# Patient Record
Sex: FEMALE | Race: BLACK OR AFRICAN AMERICAN | Employment: FULL TIME | URBAN - METROPOLITAN AREA
[De-identification: names, ages, dates, MRNs, and addresses within clinical notes are randomized per-mention and may not be internally consistent; named-entity substitution may affect disease eponyms.]

---

## 2017-05-15 ENCOUNTER — HOSPITAL ENCOUNTER (OUTPATIENT)
Dept: OTHER | Age: 52
Discharge: HOME | End: 2017-05-15
Attending: SPECIALIST | Admitting: SPECIALIST

## 2017-05-15 LAB
A/G RATIO: 1.2 (ref 1.1–2.2)
ALBUMIN SERPL-MCNC: 3.8 G/DL (ref 3.4–5)
ALP BLD-CCNC: 111 U/L (ref 40–129)
ALT SERPL-CCNC: 21 U/L (ref 10–40)
ANION GAP SERPL CALCULATED.3IONS-SCNC: 13 MMOL/L (ref 3–16)
AST SERPL-CCNC: 16 U/L (ref 15–37)
BILIRUB SERPL-MCNC: <0.2 MG/DL (ref 0–1)
BUN BLDV-MCNC: 16 MG/DL (ref 7–20)
CALCIUM SERPL-MCNC: 9.4 MG/DL (ref 8.3–10.6)
CHLORIDE BLD-SCNC: 101 MMOL/L (ref 99–110)
CO2: 27 MMOL/L (ref 21–32)
CREAT SERPL-MCNC: 1.1 MG/DL (ref 0.6–1.1)
GFR AFRICAN AMERICAN: >60
GFR NON-AFRICAN AMERICAN: 52
GLOBULIN: 3.1 G/DL
GLUCOSE BLD-MCNC: 87 MG/DL (ref 70–99)
HCT VFR BLD CALC: 37.9 % (ref 36–48)
HEMOGLOBIN: 11.7 G/DL (ref 12–16)
MCH RBC QN AUTO: 25.8 PG (ref 26–34)
MCHC RBC AUTO-ENTMCNC: 31 G/DL (ref 31–36)
MCV RBC AUTO: 83.3 FL (ref 80–100)
PDW BLD-RTO: 16.7 % (ref 12.4–15.4)
PLATELET # BLD: 308 K/UL (ref 135–450)
PMV BLD AUTO: 8.5 FL (ref 5–10.5)
POTASSIUM SERPL-SCNC: 4.4 MMOL/L (ref 3.5–5.1)
RBC # BLD: 4.54 M/UL (ref 4–5.2)
SODIUM BLD-SCNC: 141 MMOL/L (ref 136–145)
T4 FREE: 0.9 NG/DL (ref 0.9–1.8)
T4 TOTAL: 4.7 UG/DL (ref 4.5–10.9)
TOTAL PROTEIN: 6.9 G/DL (ref 6.4–8.2)
TSH SERPL DL<=0.05 MIU/L-ACNC: 3.1 UIU/ML (ref 0.27–4.2)
WBC # BLD: 11.1 K/UL (ref 4–11)

## 2017-10-03 ENCOUNTER — HOSPITAL ENCOUNTER (OUTPATIENT)
Dept: NON INVASIVE DIAGNOSTICS | Age: 52
Discharge: HOME | End: 2017-10-03
Attending: SPECIALIST | Admitting: SPECIALIST

## 2017-10-03 LAB
LEFT VENTRICULAR EJECTION FRACTION HIGH VALUE: 55 %
LEFT VENTRICULAR EJECTION FRACTION MODE: NORMAL
LV EF: 55 %
LVEF MODALITY: NORMAL

## 2023-04-26 ENCOUNTER — HOSPITAL ENCOUNTER (EMERGENCY)
Age: 58
Discharge: HOME | End: 2023-04-26
Attending: EMERGENCY MEDICINE
Payer: COMMERCIAL

## 2023-04-26 VITALS
BODY MASS INDEX: 40.24 KG/M2 | RESPIRATION RATE: 16 BRPM | OXYGEN SATURATION: 100 % | HEIGHT: 63 IN | SYSTOLIC BLOOD PRESSURE: 155 MMHG | HEART RATE: 94 BPM | TEMPERATURE: 97.1 F | DIASTOLIC BLOOD PRESSURE: 87 MMHG | WEIGHT: 227.1 LBS

## 2023-04-26 DIAGNOSIS — R11.2 NAUSEA AND VOMITING, UNSPECIFIED VOMITING TYPE: Primary | ICD-10-CM

## 2023-04-26 LAB
ALBUMIN SERPL-MCNC: 4 G/DL (ref 3.4–5)
ALBUMIN/GLOB SERPL: 1.1 {RATIO} (ref 1.1–2.2)
ALP SERPL-CCNC: 120 U/L (ref 40–129)
ALT SERPL-CCNC: 21 U/L (ref 10–40)
ANION GAP SERPL CALCULATED.3IONS-SCNC: 11 MMOL/L (ref 3–16)
AST SERPL-CCNC: 21 U/L (ref 15–37)
BACTERIA URNS QL MICRO: ABNORMAL /HPF
BASOPHILS # BLD: 0.1 K/UL (ref 0–0.2)
BASOPHILS NFR BLD: 0.7 %
BILIRUB SERPL-MCNC: <0.2 MG/DL (ref 0–1)
BILIRUB UR QL STRIP.AUTO: NEGATIVE
BUN SERPL-MCNC: 10 MG/DL (ref 7–20)
CALCIUM SERPL-MCNC: 9.8 MG/DL (ref 8.3–10.6)
CHLORIDE SERPL-SCNC: 103 MMOL/L (ref 99–110)
CLARITY UR: ABNORMAL
CO2 SERPL-SCNC: 27 MMOL/L (ref 21–32)
COLOR UR: YELLOW
CREAT SERPL-MCNC: 0.7 MG/DL (ref 0.6–1.1)
DEPRECATED RDW RBC AUTO: 15.9 % (ref 12.4–15.4)
EOSINOPHIL # BLD: 0.1 K/UL (ref 0–0.6)
EOSINOPHIL NFR BLD: 1.1 %
EPI CELLS #/AREA URNS HPF: ABNORMAL /HPF (ref 0–5)
GFR SERPLBLD CREATININE-BSD FMLA CKD-EPI: >60 ML/MIN/{1.73_M2}
GLUCOSE SERPL-MCNC: 113 MG/DL (ref 70–99)
GLUCOSE UR STRIP.AUTO-MCNC: NEGATIVE MG/DL
HCT VFR BLD AUTO: 38.6 % (ref 36–48)
HGB BLD-MCNC: 12.3 G/DL (ref 12–16)
HGB UR QL STRIP.AUTO: ABNORMAL
KETONES UR STRIP.AUTO-MCNC: NEGATIVE MG/DL
LEUKOCYTE ESTERASE UR QL STRIP.AUTO: NEGATIVE
LIPASE SERPL-CCNC: 29 U/L (ref 13–60)
LYMPHOCYTES # BLD: 3.5 K/UL (ref 1–5.1)
LYMPHOCYTES NFR BLD: 26 %
MCH RBC QN AUTO: 24.9 PG (ref 26–34)
MCHC RBC AUTO-ENTMCNC: 31.8 G/DL (ref 31–36)
MCV RBC AUTO: 78.4 FL (ref 80–100)
MONOCYTES # BLD: 1 K/UL (ref 0–1.3)
MONOCYTES NFR BLD: 7.5 %
MUCOUS THREADS #/AREA URNS LPF: ABNORMAL /LPF
NEUTROPHILS # BLD: 8.7 K/UL (ref 1.7–7.7)
NEUTROPHILS NFR BLD: 64.7 %
NITRITE UR QL STRIP.AUTO: NEGATIVE
PH UR STRIP.AUTO: 5.5 [PH] (ref 5–8)
PLATELET # BLD AUTO: 336 K/UL (ref 135–450)
PMV BLD AUTO: 8 FL (ref 5–10.5)
POTASSIUM SERPL-SCNC: 4 MMOL/L (ref 3.5–5.1)
PROT SERPL-MCNC: 7.6 G/DL (ref 6.4–8.2)
PROT UR STRIP.AUTO-MCNC: NEGATIVE MG/DL
RBC # BLD AUTO: 4.92 M/UL (ref 4–5.2)
RBC #/AREA URNS HPF: ABNORMAL /HPF (ref 0–4)
SODIUM SERPL-SCNC: 141 MMOL/L (ref 136–145)
SP GR UR STRIP.AUTO: 1.02 (ref 1–1.03)
UA COMPLETE W REFLEX CULTURE PNL UR: ABNORMAL
UA DIPSTICK W REFLEX MICRO PNL UR: YES
URN SPEC COLLECT METH UR: ABNORMAL
UROBILINOGEN UR STRIP-ACNC: 0.2 E.U./DL
WBC # BLD AUTO: 13.5 K/UL (ref 4–11)
WBC #/AREA URNS HPF: ABNORMAL /HPF (ref 0–5)

## 2023-04-26 PROCEDURE — 85025 COMPLETE CBC W/AUTO DIFF WBC: CPT

## 2023-04-26 PROCEDURE — 81001 URINALYSIS AUTO W/SCOPE: CPT

## 2023-04-26 PROCEDURE — 99284 EMERGENCY DEPT VISIT MOD MDM: CPT

## 2023-04-26 PROCEDURE — 2580000003 HC RX 258: Performed by: EMERGENCY MEDICINE

## 2023-04-26 PROCEDURE — 6360000002 HC RX W HCPCS: Performed by: EMERGENCY MEDICINE

## 2023-04-26 PROCEDURE — 80053 COMPREHEN METABOLIC PANEL: CPT

## 2023-04-26 PROCEDURE — 83690 ASSAY OF LIPASE: CPT

## 2023-04-26 PROCEDURE — 93005 ELECTROCARDIOGRAM TRACING: CPT | Performed by: EMERGENCY MEDICINE

## 2023-04-26 RX ORDER — ONDANSETRON 4 MG/1
4 TABLET, FILM COATED ORAL 3 TIMES DAILY PRN
Qty: 15 TABLET | Refills: 0 | Status: SHIPPED | OUTPATIENT
Start: 2023-04-26

## 2023-04-26 RX ORDER — ONDANSETRON 2 MG/ML
4 INJECTION INTRAMUSCULAR; INTRAVENOUS ONCE
Status: COMPLETED | OUTPATIENT
Start: 2023-04-26 | End: 2023-04-26

## 2023-04-26 RX ORDER — 0.9 % SODIUM CHLORIDE 0.9 %
1000 INTRAVENOUS SOLUTION INTRAVENOUS ONCE
Status: COMPLETED | OUTPATIENT
Start: 2023-04-26 | End: 2023-04-26

## 2023-04-26 RX ADMIN — ONDANSETRON 4 MG: 2 INJECTION INTRAMUSCULAR; INTRAVENOUS at 14:40

## 2023-04-26 RX ADMIN — SODIUM CHLORIDE 1000 ML: 9 INJECTION, SOLUTION INTRAVENOUS at 14:40

## 2023-04-26 ASSESSMENT — PAIN - FUNCTIONAL ASSESSMENT: PAIN_FUNCTIONAL_ASSESSMENT: 0-10

## 2023-04-26 ASSESSMENT — PAIN DESCRIPTION - LOCATION: LOCATION: ABDOMEN

## 2023-04-26 ASSESSMENT — PAIN SCALES - GENERAL: PAINLEVEL_OUTOF10: 6

## 2023-04-26 NOTE — ED PROVIDER NOTES
Emergency Department Provider Note  Location: [address] [place] [address]  4/26/2023     Patient Identification  Stefania Joseph is a 59 y.o. female    Chief Complaint  Emesis (Pt took a dose of Trulicity (says dose had a \"4\" in it, but unsure what actual dose was) last Wednesday. Began having vomiting and bloating Wednesday night that has persisted until today. Pt is not prescribed Trulicity, but used her friends in order to see if it would help her lose weight. )          HPI  (History provided by patient)  Patient is a 47-year-old female with history of thyroid disease morbid obesity, who presents for nausea vomiting diarrhea since last Wednesday. Patient reports that she purposely injected a friend's Trulicity, unknown dose, last Wednesday. She did this intentionally for weight loss purposes. Few hours after injecting and she developed nausea followed by nonbloody nonbilious emesis and loose stools. She denies any fevers or abdominal pains chest pain exertional symptoms. States she has decreased appetite. She is here because she is still nauseous and starts mainly after she starts eating food. I have reviewed the following nursing documentation:  Allergies: No Known Allergies    Past medical history:  has a past medical history of Headache, Headache(784.0), History of broken nose, Hyperthyroidism, Thyroid activity decreased, and Trigeminal neuralgia. Past surgical history:  has a past surgical history that includes knee surgery (Left). Home medications:   Prior to Admission medications    Medication Sig Start Date End Date Taking?  Authorizing Provider   ondansetron (ZOFRAN) 4 MG tablet Take 1 tablet by mouth 3 times daily as needed for Nausea or Vomiting 4/26/23  Yes Blake Duval MD   naproxen (NAPROSYN) 500 MG tablet Take 1 tablet by mouth 2 times daily (with meals)  Patient not taking: Reported on 4/26/2023 4/18/16   INA Bertrand   losartan (COZAAR) 100 MG tablet Take 100

## 2023-04-26 NOTE — DISCHARGE INSTRUCTIONS
Do not use other peoples medications. Stay hydrated. Drink plenty of fluid. We will prescribing you nausea medication so you can stay hydrated. Follow-up with primary doctor. Return to emergency department any new emergent concerns.

## 2023-04-27 LAB
EKG ATRIAL RATE: 86 BPM
EKG DIAGNOSIS: NORMAL
EKG P AXIS: 47 DEGREES
EKG P-R INTERVAL: 174 MS
EKG Q-T INTERVAL: 350 MS
EKG QRS DURATION: 88 MS
EKG QTC CALCULATION (BAZETT): 418 MS
EKG R AXIS: 19 DEGREES
EKG T AXIS: 23 DEGREES
EKG VENTRICULAR RATE: 86 BPM

## 2023-04-27 PROCEDURE — 93010 ELECTROCARDIOGRAM REPORT: CPT | Performed by: INTERNAL MEDICINE

## 2024-07-17 ENCOUNTER — APPOINTMENT (OUTPATIENT)
Dept: GENERAL RADIOLOGY | Age: 59
End: 2024-07-17
Payer: COMMERCIAL

## 2024-07-17 ENCOUNTER — APPOINTMENT (OUTPATIENT)
Dept: CT IMAGING | Age: 59
End: 2024-07-17
Payer: COMMERCIAL

## 2024-07-17 ENCOUNTER — HOSPITAL ENCOUNTER (EMERGENCY)
Age: 59
Discharge: HOME | End: 2024-07-17
Attending: EMERGENCY MEDICINE
Payer: COMMERCIAL

## 2024-07-17 VITALS
WEIGHT: 230.6 LBS | DIASTOLIC BLOOD PRESSURE: 85 MMHG | SYSTOLIC BLOOD PRESSURE: 187 MMHG | HEART RATE: 88 BPM | TEMPERATURE: 98.4 F | BODY MASS INDEX: 40.85 KG/M2 | RESPIRATION RATE: 20 BRPM | OXYGEN SATURATION: 99 %

## 2024-07-17 DIAGNOSIS — M54.12 CERVICAL RADICULOPATHY: Primary | ICD-10-CM

## 2024-07-17 PROCEDURE — 70450 CT HEAD/BRAIN W/O DYE: CPT

## 2024-07-17 PROCEDURE — 6360000002 HC RX W HCPCS: Performed by: EMERGENCY MEDICINE

## 2024-07-17 PROCEDURE — 6370000000 HC RX 637 (ALT 250 FOR IP): Performed by: EMERGENCY MEDICINE

## 2024-07-17 PROCEDURE — 99284 EMERGENCY DEPT VISIT MOD MDM: CPT

## 2024-07-17 PROCEDURE — 96372 THER/PROPH/DIAG INJ SC/IM: CPT

## 2024-07-17 PROCEDURE — 73030 X-RAY EXAM OF SHOULDER: CPT

## 2024-07-17 PROCEDURE — 72125 CT NECK SPINE W/O DYE: CPT

## 2024-07-17 RX ORDER — LORAZEPAM 0.5 MG/1
0.5 TABLET ORAL ONCE
Status: COMPLETED | OUTPATIENT
Start: 2024-07-17 | End: 2024-07-17

## 2024-07-17 RX ORDER — LIDOCAINE 50 MG/G
1 PATCH TOPICAL DAILY
Qty: 30 PATCH | Refills: 0 | Status: SHIPPED | OUTPATIENT
Start: 2024-07-17

## 2024-07-17 RX ORDER — LORAZEPAM 1 MG/1
TABLET ORAL
Qty: 6 TABLET | Refills: 0 | Status: SHIPPED | OUTPATIENT
Start: 2024-07-17 | End: 2024-07-20

## 2024-07-17 RX ORDER — PREDNISONE 10 MG/1
60 TABLET ORAL DAILY
Qty: 30 TABLET | Refills: 0 | Status: SHIPPED | OUTPATIENT
Start: 2024-07-17 | End: 2024-07-22

## 2024-07-17 RX ORDER — OXYCODONE HYDROCHLORIDE AND ACETAMINOPHEN 5; 325 MG/1; MG/1
1-2 TABLET ORAL EVERY 6 HOURS PRN
Qty: 6 TABLET | Refills: 0 | Status: SHIPPED | OUTPATIENT
Start: 2024-07-17 | End: 2024-07-24

## 2024-07-17 RX ORDER — KETOROLAC TROMETHAMINE 30 MG/ML
30 INJECTION, SOLUTION INTRAMUSCULAR; INTRAVENOUS ONCE
Status: COMPLETED | OUTPATIENT
Start: 2024-07-17 | End: 2024-07-17

## 2024-07-17 RX ADMIN — LORAZEPAM 0.5 MG: 0.5 TABLET ORAL at 01:29

## 2024-07-17 RX ADMIN — KETOROLAC TROMETHAMINE 30 MG: 30 INJECTION INTRAMUSCULAR; INTRAVENOUS at 01:29

## 2024-07-17 ASSESSMENT — PAIN DESCRIPTION - LOCATION: LOCATION: SHOULDER;NECK;ARM

## 2024-07-17 ASSESSMENT — PAIN - FUNCTIONAL ASSESSMENT: PAIN_FUNCTIONAL_ASSESSMENT: 0-10

## 2024-07-17 ASSESSMENT — PAIN DESCRIPTION - ORIENTATION: ORIENTATION: RIGHT

## 2024-07-17 ASSESSMENT — PAIN SCALES - GENERAL: PAINLEVEL_OUTOF10: 9

## 2024-07-17 NOTE — ED PROVIDER NOTES
TABLET    Take 5 mg by mouth 3 times daily    NAPROXEN (NAPROSYN) 500 MG TABLET    Take 1 tablet by mouth 2 times daily (with meals)    OMEPRAZOLE (PRILOSEC) 20 MG CAPSULE    Take 20 mg by mouth daily    ONDANSETRON (ZOFRAN) 4 MG TABLET    Take 1 tablet by mouth 3 times daily as needed for Nausea or Vomiting    OXCARBAZEPINE (TRILEPTAL) 150 MG TABLET    Take 150 mg by mouth 2 times daily       ALLERGIES     Patient has no known allergies.    FAMILY HISTORY       Family History   Problem Relation Age of Onset    Arthritis Mother     High Blood Pressure Father     Asthma Brother     Arthritis Maternal Aunt     Cancer Maternal Aunt     High Blood Pressure Maternal Aunt     Stroke Maternal Aunt     Arthritis Maternal Uncle     Stroke Paternal Aunt     Arthritis Maternal Grandmother     Diabetes Maternal Grandmother     High Blood Pressure Maternal Grandmother     Cancer Maternal Grandfather     Diabetes Maternal Grandfather     Mental Illness Maternal Cousin           SOCIAL HISTORY       Social History     Socioeconomic History    Marital status:      Spouse name: None    Number of children: None    Years of education: None    Highest education level: None   Tobacco Use    Smoking status: Former     Current packs/day: 0.50     Average packs/day: 0.5 packs/day for 35.2 years (17.6 ttl pk-yrs)     Types: Cigarettes     Start date: 4/24/1989    Tobacco comments:     the patient smokes a half a pack a day, pt is ready to quit   Substance and Sexual Activity    Alcohol use: Not Currently    Drug use: Yes     Types: Marijuana (Weed)    Sexual activity: Defer       SCREENINGS    Kristin Coma Scale  Eye Opening: Spontaneous  Best Verbal Response: Oriented  Best Motor Response: Obeys commands  Kristin Coma Scale Score: 15 @FLOW(27621327)@      PHYSICAL EXAM    (up to 7 for level 4, 8 or more for level 5)     ED Triage Vitals [07/17/24 0119]   BP Temp Temp Source Pulse Respirations SpO2 Height Weight - Scale   (!) 187/85

## 2024-07-17 NOTE — DISCHARGE INSTRUCTIONS
Discharge home  Sling for comfort  Lidoderm patch  Ativan taper  Prednisone  Follow-up with [facility]  Alternate ice and heat

## 2024-10-21 ENCOUNTER — HOSPITAL ENCOUNTER (EMERGENCY)
Age: 59
Discharge: HOME | End: 2024-10-21
Attending: EMERGENCY MEDICINE
Payer: COMMERCIAL

## 2024-10-21 VITALS
SYSTOLIC BLOOD PRESSURE: 157 MMHG | RESPIRATION RATE: 14 BRPM | WEIGHT: 240 LBS | TEMPERATURE: 98.3 F | OXYGEN SATURATION: 99 % | HEART RATE: 102 BPM | BODY MASS INDEX: 42.52 KG/M2 | DIASTOLIC BLOOD PRESSURE: 84 MMHG | HEIGHT: 63 IN

## 2024-10-21 DIAGNOSIS — M54.31 SCIATICA OF RIGHT SIDE: Primary | ICD-10-CM

## 2024-10-21 PROCEDURE — 6360000002 HC RX W HCPCS: Performed by: EMERGENCY MEDICINE

## 2024-10-21 PROCEDURE — 96372 THER/PROPH/DIAG INJ SC/IM: CPT

## 2024-10-21 PROCEDURE — 99284 EMERGENCY DEPT VISIT MOD MDM: CPT

## 2024-10-21 RX ORDER — HYDROCODONE BITARTRATE AND ACETAMINOPHEN 5; 325 MG/1; MG/1
1 TABLET ORAL EVERY 6 HOURS PRN
Qty: 12 TABLET | Refills: 0 | Status: SHIPPED | OUTPATIENT
Start: 2024-10-21 | End: 2024-10-24

## 2024-10-21 RX ORDER — KETOROLAC TROMETHAMINE 30 MG/ML
30 INJECTION, SOLUTION INTRAMUSCULAR; INTRAVENOUS ONCE
Status: COMPLETED | OUTPATIENT
Start: 2024-10-21 | End: 2024-10-21

## 2024-10-21 RX ORDER — LIDOCAINE 4 G/G
1 PATCH TOPICAL EVERY 24 HOURS
Qty: 30 PATCH | Refills: 0 | Status: SHIPPED | OUTPATIENT
Start: 2024-10-21 | End: 2024-11-20

## 2024-10-21 RX ORDER — NAPROXEN 500 MG/1
500 TABLET ORAL 2 TIMES DAILY PRN
Qty: 20 TABLET | Refills: 0 | Status: SHIPPED | OUTPATIENT
Start: 2024-10-21 | End: 2024-10-31

## 2024-10-21 RX ORDER — METHOCARBAMOL 500 MG/1
500 TABLET, FILM COATED ORAL 4 TIMES DAILY PRN
Qty: 20 TABLET | Refills: 0 | Status: SHIPPED | OUTPATIENT
Start: 2024-10-21 | End: 2024-10-26

## 2024-10-21 RX ORDER — METHYLPREDNISOLONE 4 MG/1
TABLET ORAL
Qty: 1 KIT | Refills: 0 | Status: SHIPPED | OUTPATIENT
Start: 2024-10-21 | End: 2024-10-27

## 2024-10-21 RX ADMIN — KETOROLAC TROMETHAMINE 30 MG: 30 INJECTION INTRAMUSCULAR; INTRAVENOUS at 11:28

## 2024-10-21 ASSESSMENT — PAIN - FUNCTIONAL ASSESSMENT
PAIN_FUNCTIONAL_ASSESSMENT: 0-10
PAIN_FUNCTIONAL_ASSESSMENT: 0-10

## 2024-10-21 ASSESSMENT — PAIN DESCRIPTION - DESCRIPTORS
DESCRIPTORS: ACHING
DESCRIPTORS: ACHING

## 2024-10-21 ASSESSMENT — PAIN DESCRIPTION - FREQUENCY
FREQUENCY: CONTINUOUS
FREQUENCY: CONTINUOUS

## 2024-10-21 ASSESSMENT — PAIN DESCRIPTION - PAIN TYPE
TYPE: ACUTE PAIN
TYPE: ACUTE PAIN

## 2024-10-21 ASSESSMENT — PAIN DESCRIPTION - LOCATION
LOCATION: BACK
LOCATION: BACK

## 2024-10-21 ASSESSMENT — PAIN SCALES - GENERAL
PAINLEVEL_OUTOF10: 9
PAINLEVEL_OUTOF10: 9

## 2024-10-21 NOTE — ED NOTES
Patient to ed with complaints of back pain which as been intermittent in the past, pain started the first of this month and worsened patient denies injury.

## 2024-10-21 NOTE — ED PROVIDER NOTES
New Prescriptions    HYDROCODONE-ACETAMINOPHEN (NORCO) 5-325 MG PER TABLET    Take 1 tablet by mouth every 6 hours as needed for Pain for up to 3 days. Intended supply: 3 days. Take lowest dose possible to manage pain Max Daily Amount: 4 tablets    LIDOCAINE 4 % EXTERNAL PATCH    Place 1 patch onto the skin every 24 hours Place 1 patch onto the skin daily 12 hours on, 12 hours off.    METHOCARBAMOL (ROBAXIN) 500 MG TABLET    Take 1 tablet by mouth 4 times daily as needed (Muscle spasms)    METHYLPREDNISOLONE (MEDROL DOSEPACK) 4 MG TABLET    Take by mouth.    NAPROXEN (NAPROSYN) 500 MG TABLET    Take 1 tablet by mouth 2 times daily as needed for Pain (with meals)         Problems Addressed This Visit:      1.  Sciatica: Right-sided.  Neurologically intact.  No weakness no numbness.  Reproducible tenderness.  -Discharged with Naprosyn, Robaxin, Medrol Dosepak and a small number of Vicodin for severe pain  -No lifting greater than 5 pounds for 1 week  -Work note given for today and tomorrow  -Referral to [facility]  -Back pain instructions reviewed          Discussed Patient with another provider(s) and Healthcare Team:     Social Determinants of Health:   Care of patient discussed with nursing team and nursing documentation reviewed.        IMPRESSION:  1. Sciatica of right side           Critical Care Time: none     DISPOSITION: home    Condition at Discharge/Transfer from Department:  Stable        In cases where narcotics are prescribed, MASSIMO report was obtained, reviewed, and made part of record. After examining available information, and risks of prescribing or dispensing controlled substances was explained to the patient (including non-treatment or other treatment), it is considered medically appropriate to administer narcotics as prescribed.      Radha Olson MD  Emergency Medicine      10/21/24     Radha Olson MD  10/21/24 0671

## 2024-10-21 NOTE — DISCHARGE INSTR - COC
Facility/ Agency   Name:   Address:  Phone:  Fax:    Dialysis Facility (if applicable)   Name:  Address:  Dialysis Schedule:  Phone:  Fax:    / signature: {Esignature:[id number]}    PHYSICIAN SECTION    Prognosis: {Prognosis:3710885169}    Condition at Discharge: {MH Patient Condition:697623988}    Rehab Potential (if transferring to Rehab): {Prognosis:8581067622}    Recommended Labs or Other Treatments After Discharge: ***    Physician Certification: I certify the above information and transfer of Susan Lofton  is necessary for the continuing treatment of the diagnosis listed and that she requires {Admit to Appropriate Level of Care:51777} for {GREATER/LESS:912873732} 30 days.     Update Admission H&P: {CHP DME Changes in HandP:077109484}    PHYSICIAN SIGNATURE:  {Esignature:[id number]}

## 2024-12-23 ENCOUNTER — HOSPITAL ENCOUNTER (EMERGENCY)
Age: 59
Discharge: HOME | End: 2024-12-23
Payer: COMMERCIAL

## 2024-12-23 ENCOUNTER — APPOINTMENT (OUTPATIENT)
Dept: GENERAL RADIOLOGY | Age: 59
End: 2024-12-23
Payer: COMMERCIAL

## 2024-12-23 VITALS
BODY MASS INDEX: 40.75 KG/M2 | RESPIRATION RATE: 16 BRPM | DIASTOLIC BLOOD PRESSURE: 79 MMHG | OXYGEN SATURATION: 99 % | HEART RATE: 98 BPM | HEIGHT: 63 IN | TEMPERATURE: 98.7 F | SYSTOLIC BLOOD PRESSURE: 166 MMHG | WEIGHT: 230 LBS

## 2024-12-23 DIAGNOSIS — U07.1 COVID-19: Primary | ICD-10-CM

## 2024-12-23 LAB
ALBUMIN SERPL-MCNC: 3.5 G/DL (ref 3.4–5)
ALBUMIN/GLOB SERPL: 0.9 {RATIO} (ref 1.1–2.2)
ALP SERPL-CCNC: 140 U/L (ref 40–129)
ALT SERPL-CCNC: 19 U/L (ref 10–40)
ANION GAP SERPL CALCULATED.3IONS-SCNC: 9 MMOL/L (ref 3–16)
AST SERPL-CCNC: 24 U/L (ref 15–37)
BACTERIA URNS QL MICRO: ABNORMAL /HPF
BASOPHILS # BLD: 0 K/UL (ref 0–0.2)
BASOPHILS NFR BLD: 0.5 %
BILIRUB SERPL-MCNC: 0.3 MG/DL (ref 0–1)
BILIRUB UR QL STRIP.AUTO: NEGATIVE
BUN SERPL-MCNC: 9 MG/DL (ref 7–20)
CALCIUM SERPL-MCNC: 9.7 MG/DL (ref 8.3–10.6)
CHLORIDE SERPL-SCNC: 100 MMOL/L (ref 99–110)
CLARITY UR: CLEAR
CO2 SERPL-SCNC: 26 MMOL/L (ref 21–32)
COLOR UR: YELLOW
CREAT SERPL-MCNC: 0.6 MG/DL (ref 0.6–1.2)
DEPRECATED RDW RBC AUTO: 15.2 % (ref 12.4–15.4)
EOSINOPHIL # BLD: 0 K/UL (ref 0–0.6)
EOSINOPHIL NFR BLD: 0.4 %
EPI CELLS #/AREA URNS AUTO: 8 /HPF (ref 0–5)
FLUAV RNA RESP QL NAA+PROBE: NOT DETECTED
FLUBV RNA RESP QL NAA+PROBE: NOT DETECTED
GFR SERPLBLD CREATININE-BSD FMLA CKD-EPI: >90 ML/MIN/{1.73_M2}
GLUCOSE SERPL-MCNC: 224 MG/DL (ref 70–99)
GLUCOSE UR STRIP.AUTO-MCNC: 250 MG/DL
HCT VFR BLD AUTO: 38.7 % (ref 36–48)
HGB BLD-MCNC: 12.3 G/DL (ref 12–16)
HGB UR QL STRIP.AUTO: NEGATIVE
HYALINE CASTS #/AREA URNS AUTO: 0 /LPF (ref 0–8)
KETONES UR STRIP.AUTO-MCNC: NEGATIVE MG/DL
LEUKOCYTE ESTERASE UR QL STRIP.AUTO: ABNORMAL
LIPASE SERPL-CCNC: 25 U/L (ref 13–60)
LYMPHOCYTES # BLD: 2.2 K/UL (ref 1–5.1)
LYMPHOCYTES NFR BLD: 24.2 %
MCH RBC QN AUTO: 25.3 PG (ref 26–34)
MCHC RBC AUTO-ENTMCNC: 31.9 G/DL (ref 31–36)
MCV RBC AUTO: 79.4 FL (ref 80–100)
MONOCYTES # BLD: 1 K/UL (ref 0–1.3)
MONOCYTES NFR BLD: 11.3 %
NEUTROPHILS # BLD: 5.7 K/UL (ref 1.7–7.7)
NEUTROPHILS NFR BLD: 63.6 %
NITRITE UR QL STRIP.AUTO: NEGATIVE
PH UR STRIP.AUTO: 5.5 [PH] (ref 5–8)
PLATELET # BLD AUTO: 241 K/UL (ref 135–450)
PMV BLD AUTO: 7.7 FL (ref 5–10.5)
POTASSIUM SERPL-SCNC: 4.2 MMOL/L (ref 3.5–5.1)
PROT SERPL-MCNC: 7.3 G/DL (ref 6.4–8.2)
PROT UR STRIP.AUTO-MCNC: ABNORMAL MG/DL
RBC # BLD AUTO: 4.87 M/UL (ref 4–5.2)
RBC CLUMPS #/AREA URNS AUTO: 1 /HPF (ref 0–4)
SARS-COV-2 RNA RESP QL NAA+PROBE: DETECTED
SODIUM SERPL-SCNC: 135 MMOL/L (ref 136–145)
SP GR UR STRIP.AUTO: 1.02 (ref 1–1.03)
UA COMPLETE W REFLEX CULTURE PNL UR: ABNORMAL
UA DIPSTICK W REFLEX MICRO PNL UR: YES
URN SPEC COLLECT METH UR: ABNORMAL
UROBILINOGEN UR STRIP-ACNC: 0.2 E.U./DL
WBC # BLD AUTO: 9 K/UL (ref 4–11)
WBC #/AREA URNS AUTO: 3 /HPF (ref 0–5)

## 2024-12-23 PROCEDURE — 71046 X-RAY EXAM CHEST 2 VIEWS: CPT

## 2024-12-23 PROCEDURE — 80053 COMPREHEN METABOLIC PANEL: CPT

## 2024-12-23 PROCEDURE — 87636 SARSCOV2 & INF A&B AMP PRB: CPT

## 2024-12-23 PROCEDURE — 99284 EMERGENCY DEPT VISIT MOD MDM: CPT

## 2024-12-23 PROCEDURE — 85025 COMPLETE CBC W/AUTO DIFF WBC: CPT

## 2024-12-23 PROCEDURE — 83690 ASSAY OF LIPASE: CPT

## 2024-12-23 PROCEDURE — 81001 URINALYSIS AUTO W/SCOPE: CPT

## 2024-12-23 RX ORDER — NIRMATRELVIR AND RITONAVIR 150-100 MG
KIT ORAL
Qty: 20 TABLET | Refills: 0 | Status: SHIPPED | OUTPATIENT
Start: 2024-12-23 | End: 2024-12-28

## 2024-12-23 ASSESSMENT — PAIN DESCRIPTION - DESCRIPTORS: DESCRIPTORS: ACHING

## 2024-12-23 ASSESSMENT — ENCOUNTER SYMPTOMS
SHORTNESS OF BREATH: 0
ABDOMINAL PAIN: 0
VOMITING: 0
NAUSEA: 0
COUGH: 1
CHEST TIGHTNESS: 0

## 2024-12-23 ASSESSMENT — PAIN DESCRIPTION - LOCATION: LOCATION: GENERALIZED

## 2024-12-23 ASSESSMENT — PAIN - FUNCTIONAL ASSESSMENT: PAIN_FUNCTIONAL_ASSESSMENT: 0-10

## 2024-12-23 ASSESSMENT — PAIN SCALES - GENERAL: PAINLEVEL_OUTOF10: 5

## 2024-12-23 NOTE — DISCHARGE INSTRUCTIONS
Your COVID test was positive. Flu testing was negative.    Bloodwork is all reassuring.  Urine does not appear to be infected.  Your x-ray does not show any evidence of pneumonia.    I sent Paxlovid which is an antiviral medication.    Current CDC can be reviewed at cdc.gov.    Family medicine residency clinic information, above so you can establish with a primary care doctor.    Please return if worse

## 2024-12-24 NOTE — ED PROVIDER NOTES
mis-transcribed.)    TYRESE Leos - CNP (electronically signed)       Diane Wilkerson APRN - CNP  12/23/24 8066